# Patient Record
Sex: FEMALE | URBAN - METROPOLITAN AREA
[De-identification: names, ages, dates, MRNs, and addresses within clinical notes are randomized per-mention and may not be internally consistent; named-entity substitution may affect disease eponyms.]

---

## 2023-02-01 PROBLEM — 235595009 GASTROESOPHAGEAL REFLUX DISEASE: Status: ACTIVE | Noted: 2023-02-01

## 2023-02-02 ENCOUNTER — OFFICE VISIT (OUTPATIENT)
Dept: URBAN - METROPOLITAN AREA CLINIC 19 | Facility: CLINIC | Age: 58
End: 2023-02-02

## 2023-02-02 NOTE — HPI-TODAY'S VISIT:
2/2/2023:  Dav: The pt is a 56 yo F who presents for concerns of reflux.  Symptoms ongoing for  Symptoms Medications tried Medications that helped H. pylori testing  Prior endoscopy Smoking hx Alcohol use Other recreational drugs FH of luminal GI cancers